# Patient Record
Sex: MALE | Race: WHITE | HISPANIC OR LATINO | Employment: FULL TIME | URBAN - METROPOLITAN AREA
[De-identification: names, ages, dates, MRNs, and addresses within clinical notes are randomized per-mention and may not be internally consistent; named-entity substitution may affect disease eponyms.]

---

## 2023-10-23 ENCOUNTER — APPOINTMENT (EMERGENCY)
Dept: RADIOLOGY | Facility: HOSPITAL | Age: 23
End: 2023-10-23

## 2023-10-23 ENCOUNTER — HOSPITAL ENCOUNTER (EMERGENCY)
Facility: HOSPITAL | Age: 23
Discharge: HOME | End: 2023-10-23
Attending: EMERGENCY MEDICINE

## 2023-10-23 VITALS
WEIGHT: 298.06 LBS | HEIGHT: 75 IN | SYSTOLIC BLOOD PRESSURE: 133 MMHG | TEMPERATURE: 98.9 F | DIASTOLIC BLOOD PRESSURE: 67 MMHG | HEART RATE: 87 BPM | BODY MASS INDEX: 37.06 KG/M2 | RESPIRATION RATE: 21 BRPM | OXYGEN SATURATION: 95 %

## 2023-10-23 DIAGNOSIS — R06.00 DYSPNEA: ICD-10-CM

## 2023-10-23 DIAGNOSIS — J45.901 ASTHMA EXACERBATION: Primary | ICD-10-CM

## 2023-10-23 LAB
2HR DELTA HS TROPONIN: 0 NG/L
ALBUMIN SERPL BCP-MCNC: 4.6 G/DL (ref 3.5–5)
ALP SERPL-CCNC: 101 U/L (ref 34–104)
ALT SERPL W P-5'-P-CCNC: 61 U/L (ref 7–52)
ANION GAP SERPL CALCULATED.3IONS-SCNC: 6 MMOL/L
AST SERPL W P-5'-P-CCNC: 40 U/L (ref 13–39)
ATRIAL RATE: 99 BPM
BASE EX.OXY STD BLDV CALC-SCNC: 57.8 % (ref 60–80)
BASE EX.OXY STD BLDV CALC-SCNC: 80.1 % (ref 60–80)
BASE EXCESS BLDV CALC-SCNC: -0.1 MMOL/L
BASE EXCESS BLDV CALC-SCNC: -0.7 MMOL/L
BASOPHILS # BLD AUTO: 0.03 THOUSANDS/ÂΜL (ref 0–0.1)
BASOPHILS NFR BLD AUTO: 0 % (ref 0–1)
BILIRUB SERPL-MCNC: 0.4 MG/DL (ref 0.2–1)
BNP SERPL-MCNC: 15 PG/ML (ref 0–100)
BUN SERPL-MCNC: 19 MG/DL (ref 5–25)
CALCIUM SERPL-MCNC: 9.6 MG/DL (ref 8.4–10.2)
CARDIAC TROPONIN I PNL SERPL HS: 3 NG/L
CARDIAC TROPONIN I PNL SERPL HS: 3 NG/L
CHLORIDE SERPL-SCNC: 104 MMOL/L (ref 96–108)
CO2 SERPL-SCNC: 30 MMOL/L (ref 21–32)
CREAT SERPL-MCNC: 1.26 MG/DL (ref 0.6–1.3)
D DIMER PPP FEU-MCNC: <0.27 UG/ML FEU
EOSINOPHIL # BLD AUTO: 0.81 THOUSAND/ÂΜL (ref 0–0.61)
EOSINOPHIL NFR BLD AUTO: 8 % (ref 0–6)
ERYTHROCYTE [DISTWIDTH] IN BLOOD BY AUTOMATED COUNT: 13.2 % (ref 11.6–15.1)
FLUAV RNA RESP QL NAA+PROBE: NEGATIVE
FLUBV RNA RESP QL NAA+PROBE: NEGATIVE
GFR SERPL CREATININE-BSD FRML MDRD: 79 ML/MIN/1.73SQ M
GLUCOSE SERPL-MCNC: 113 MG/DL (ref 65–140)
HCO3 BLDV-SCNC: 26 MMOL/L (ref 24–30)
HCO3 BLDV-SCNC: 27.3 MMOL/L (ref 24–30)
HCT VFR BLD AUTO: 49.3 % (ref 36.5–49.3)
HGB BLD-MCNC: 16.1 G/DL (ref 12–17)
IMM GRANULOCYTES # BLD AUTO: 0.05 THOUSAND/UL (ref 0–0.2)
IMM GRANULOCYTES NFR BLD AUTO: 1 % (ref 0–2)
LACTATE SERPL-SCNC: 1.4 MMOL/L (ref 0.5–2)
LYMPHOCYTES # BLD AUTO: 3.85 THOUSANDS/ÂΜL (ref 0.6–4.47)
LYMPHOCYTES NFR BLD AUTO: 36 % (ref 14–44)
MCH RBC QN AUTO: 27.7 PG (ref 26.8–34.3)
MCHC RBC AUTO-ENTMCNC: 32.7 G/DL (ref 31.4–37.4)
MCV RBC AUTO: 85 FL (ref 82–98)
MONOCYTES # BLD AUTO: 0.92 THOUSAND/ÂΜL (ref 0.17–1.22)
MONOCYTES NFR BLD AUTO: 9 % (ref 4–12)
NEUTROPHILS # BLD AUTO: 4.93 THOUSANDS/ÂΜL (ref 1.85–7.62)
NEUTS SEG NFR BLD AUTO: 46 % (ref 43–75)
NRBC BLD AUTO-RTO: 0 /100 WBCS
O2 CT BLDV-SCNC: 13.8 ML/DL
O2 CT BLDV-SCNC: 18.2 ML/DL
P AXIS: 65 DEGREES
PCO2 BLDV: 47.3 MM HG (ref 42–50)
PCO2 BLDV: 57.4 MM HG (ref 42–50)
PH BLDV: 7.29 [PH] (ref 7.3–7.4)
PH BLDV: 7.36 [PH] (ref 7.3–7.4)
PLATELET # BLD AUTO: 286 THOUSANDS/UL (ref 149–390)
PMV BLD AUTO: 10.2 FL (ref 8.9–12.7)
PO2 BLDV: 32.4 MM HG (ref 35–45)
PO2 BLDV: 46.4 MM HG (ref 35–45)
POTASSIUM SERPL-SCNC: 3.9 MMOL/L (ref 3.5–5.3)
PR INTERVAL: 156 MS
PROT SERPL-MCNC: 7.9 G/DL (ref 6.4–8.4)
QRS AXIS: 53 DEGREES
QRSD INTERVAL: 98 MS
QT INTERVAL: 330 MS
QTC INTERVAL: 423 MS
RBC # BLD AUTO: 5.82 MILLION/UL (ref 3.88–5.62)
RSV RNA RESP QL NAA+PROBE: NEGATIVE
S PYO DNA THROAT QL NAA+PROBE: NOT DETECTED
SARS-COV-2 RNA RESP QL NAA+PROBE: NEGATIVE
SODIUM SERPL-SCNC: 140 MMOL/L (ref 135–147)
T WAVE AXIS: 63 DEGREES
VENTRICULAR RATE: 99 BPM
WBC # BLD AUTO: 10.59 THOUSAND/UL (ref 4.31–10.16)

## 2023-10-23 PROCEDURE — 93010 ELECTROCARDIOGRAM REPORT: CPT | Performed by: INTERNAL MEDICINE

## 2023-10-23 PROCEDURE — 83880 ASSAY OF NATRIURETIC PEPTIDE: CPT | Performed by: EMERGENCY MEDICINE

## 2023-10-23 PROCEDURE — 0241U HB NFCT DS VIR RESP RNA 4 TRGT: CPT | Performed by: EMERGENCY MEDICINE

## 2023-10-23 PROCEDURE — 84484 ASSAY OF TROPONIN QUANT: CPT | Performed by: EMERGENCY MEDICINE

## 2023-10-23 PROCEDURE — 96375 TX/PRO/DX INJ NEW DRUG ADDON: CPT

## 2023-10-23 PROCEDURE — 85379 FIBRIN DEGRADATION QUANT: CPT | Performed by: EMERGENCY MEDICINE

## 2023-10-23 PROCEDURE — 87040 BLOOD CULTURE FOR BACTERIA: CPT | Performed by: EMERGENCY MEDICINE

## 2023-10-23 PROCEDURE — 99285 EMERGENCY DEPT VISIT HI MDM: CPT

## 2023-10-23 PROCEDURE — 71045 X-RAY EXAM CHEST 1 VIEW: CPT

## 2023-10-23 PROCEDURE — 99285 EMERGENCY DEPT VISIT HI MDM: CPT | Performed by: EMERGENCY MEDICINE

## 2023-10-23 PROCEDURE — 93005 ELECTROCARDIOGRAM TRACING: CPT

## 2023-10-23 PROCEDURE — 85025 COMPLETE CBC W/AUTO DIFF WBC: CPT | Performed by: EMERGENCY MEDICINE

## 2023-10-23 PROCEDURE — 80053 COMPREHEN METABOLIC PANEL: CPT | Performed by: EMERGENCY MEDICINE

## 2023-10-23 PROCEDURE — 83605 ASSAY OF LACTIC ACID: CPT | Performed by: EMERGENCY MEDICINE

## 2023-10-23 PROCEDURE — 82805 BLOOD GASES W/O2 SATURATION: CPT | Performed by: EMERGENCY MEDICINE

## 2023-10-23 PROCEDURE — 87651 STREP A DNA AMP PROBE: CPT | Performed by: EMERGENCY MEDICINE

## 2023-10-23 PROCEDURE — 94640 AIRWAY INHALATION TREATMENT: CPT

## 2023-10-23 PROCEDURE — 96365 THER/PROPH/DIAG IV INF INIT: CPT

## 2023-10-23 PROCEDURE — 36415 COLL VENOUS BLD VENIPUNCTURE: CPT | Performed by: EMERGENCY MEDICINE

## 2023-10-23 RX ORDER — ALBUTEROL SULFATE 90 UG/1
2 AEROSOL, METERED RESPIRATORY (INHALATION) ONCE
Status: COMPLETED | OUTPATIENT
Start: 2023-10-23 | End: 2023-10-23

## 2023-10-23 RX ORDER — ALBUTEROL SULFATE 2.5 MG/3ML
2.5 SOLUTION RESPIRATORY (INHALATION) ONCE
Status: COMPLETED | OUTPATIENT
Start: 2023-10-23 | End: 2023-10-23

## 2023-10-23 RX ORDER — PREDNISONE 20 MG/1
60 TABLET ORAL DAILY
Qty: 15 TABLET | Refills: 0 | Status: SHIPPED | OUTPATIENT
Start: 2023-10-23 | End: 2023-10-28

## 2023-10-23 RX ORDER — MAGNESIUM SULFATE HEPTAHYDRATE 40 MG/ML
2 INJECTION, SOLUTION INTRAVENOUS ONCE
Status: COMPLETED | OUTPATIENT
Start: 2023-10-23 | End: 2023-10-23

## 2023-10-23 RX ORDER — ALBUTEROL SULFATE 90 UG/1
2 AEROSOL, METERED RESPIRATORY (INHALATION) EVERY 6 HOURS PRN
Qty: 8.5 G | Refills: 0 | Status: SHIPPED | OUTPATIENT
Start: 2023-10-23

## 2023-10-23 RX ORDER — METHYLPREDNISOLONE SODIUM SUCCINATE 125 MG/2ML
80 INJECTION, POWDER, LYOPHILIZED, FOR SOLUTION INTRAMUSCULAR; INTRAVENOUS ONCE
Status: COMPLETED | OUTPATIENT
Start: 2023-10-23 | End: 2023-10-23

## 2023-10-23 RX ORDER — IPRATROPIUM BROMIDE AND ALBUTEROL SULFATE 2.5; .5 MG/3ML; MG/3ML
3 SOLUTION RESPIRATORY (INHALATION) ONCE AS NEEDED
Status: COMPLETED | OUTPATIENT
Start: 2023-10-23 | End: 2023-10-23

## 2023-10-23 RX ADMIN — IPRATROPIUM BROMIDE AND ALBUTEROL SULFATE 3 ML: 2.5; .5 SOLUTION RESPIRATORY (INHALATION) at 05:00

## 2023-10-23 RX ADMIN — ALBUTEROL SULFATE 2.5 MG: 2.5 SOLUTION RESPIRATORY (INHALATION) at 03:52

## 2023-10-23 RX ADMIN — ALBUTEROL SULFATE 2 PUFF: 90 AEROSOL, METERED RESPIRATORY (INHALATION) at 06:55

## 2023-10-23 RX ADMIN — MAGNESIUM SULFATE HEPTAHYDRATE 2 G: 40 INJECTION, SOLUTION INTRAVENOUS at 04:13

## 2023-10-23 RX ADMIN — METHYLPREDNISOLONE SODIUM SUCCINATE 80 MG: 125 INJECTION, POWDER, FOR SOLUTION INTRAMUSCULAR; INTRAVENOUS at 04:13

## 2023-10-23 NOTE — Clinical Note
accompanied Yazmin Doty to the emergency department on 10/23/2023. Return date if applicable: If you have any questions or concerns, please don't hesitate to call.       Anisha Garibay MD

## 2023-10-23 NOTE — ED PROVIDER NOTES
History  Chief Complaint   Patient presents with    Shortness of Breath     Pt presents with a c/o SOB since yesterday and feels like his throat is closing. Pt has hx of asthma. Audible wheezing in triage. SPO2 98% in triage. HPI  24 y.o. male with a PMH of asthma and a solitary kidney since birth presents with a chief complaints of shortness of breath. Patient speaks primarily Sinhala so  used for initial evaluation. Patient conversant upon initial evaluation, able to provide information regarding history. Patient with tachypnea and mild accessory muscle use but without signs of hypoxia and initial pulse oxymetry without hypoxemia. There is audible wheezing so patient was started on nebulized bronchodilators immediately. Patient affirms 2 days of progressive dyspnea that started gradually, progressively worsened and continues in the emergency room. Patient notes a history of asthma that was previously treated with medication however he has not seen any physician since he moved from the [facility] 4 years ago and is not on any medications regularly. Patient affirms cough. Patient affirms  chest pain that he describes as a tightness. Patient denies acute leg pain or swelling. Patient denies any significant acute weight gain. Patient denies any fever/chills. Patient denies hematochezia or melanotic stools, nausea/vomiting, new rashes, diaphoresis, palpitations, weakness, dizziness, syncope, focal weakness or paresthesia. All other review of systems reviewed and noted to be negative. Patient denies a history of atherosclerotic disease (CAD/TIA/CVA/PAD). Patient denies any history of prior cardiopulmonary surgery. Patient denies any use of tobacco in the past 90 days. Patient denies any immobilization of at least 3 days or surgery in the past 4 weeks. Patient denies any history of DVT or PE.     Patient denies any history or family history of thrombophilia. Patient denies any malignancy with treatment within the past 6 months. Patient denies any extended travel greater than 10 hours [facility] 2003). Focused Objective. PHYSICAL EXAM:  Constitutional:  No acute distress  Neck:  No asymmetry without obvious masses or swelling; no tracheal deviation. No jugular venous distention. No stridor. No bruit. CV: Tachycardic rate and regular rhythm. Peripheral pulses intact and equal.  Respiratory:  Normal inspection with no rash, signs of infection, or trauma. No intercostal, supraclavicular, subcostal, or substernal retractions. No tenderness or crepitus on palpitation. Auscultation demonstrates diffuse wheezing with adequate air movement. Medical Decision Making  Patient presents with dyspnea representing a broad differential, including multiple emergent diagnoses. Given the large differential, the decision making in this case is of high complexity. Patient presents afebrile without history of fever/chills making infectious etiologies less likely based upon available information. Patient has a past medical history suggestive of bronchospasm likely secondary to uncontrolled asthma with exacerbation. EKG obtained and reviewed independently by myself, which was interpreted by myself as normal sinus rhythm with no acute ST segment changes. There is no prior to compare. Patient placed on cardiac monitoring. CXR will be obtained to evaluate for alternative pathologies, including pneumothorax, pulmonary edema, or pneumonia. Laboratory analysis including CBC to evaluate for anemia and evaluate potential for significant leukocytosis. Will obtain BMP to evaluate renal function and electrolytes including possibility of metabolic derangement accounting for patient's symptoms. Considering patient's history, will obtain troponin to evaluate for ACS.   Will obtain VBG to further evaluate possibility of metabolic derangement as a component of the patient's symptoms and evaluate the chronicity of the possible symptamatology. Will obtain B-type natriuretic peptide as screening for possible congestive heart failure as patient has no known history though patient's course is concerning for possible diagnosed etiology. Patient has symptoms and history concerning for possible pulmonary embolism. Regarding this etiology, patient's risk stratification by the Wells' Criteria for Pulmonary Embolism (Two Tier Model):  no - clinical signs or symptoms of DVT (3)  no - PE most likely diagnosis (3)  yes - Heart rate greater than 100 (1.5)  no - Immobilization at least 3 days OR surgery in the previous 4 weeks (1.5)  no - Previous, objectively diagnosed PE or DVT (1.5)  no - Hemoptysis (1)  no - Malignancy with treatment within 6 months or palliative (1)    Patient's risk further evaluated by the [facility] Criteria for Pulmonary Embolism:  no - age is greater than or equal to 50  yes - Heart rate greater than 100  yes - O2 sat on room air < 95%  no - Prior history of PE or DVT  no - Recent trauma or surgery  no - Hemoptysis  no - Use of exogenous estrogen  no - Unilateral leg swelling    Patient has a low risk Wells' criteria but is unable to be cleared via the [facility] criteria. As such, a D-Dimer will be ordered for the patient to evaluate for the potential for pulmonary embolism. If positive, CT imaging of the patient's chest will be obtained to evaluate for potential pulmonary embolism. Considering patient's history and examination, will attempt symptomatic management with Norco dilators along with corticosteroids and magnesium. Patient will be placed on continuous cardiopulmonary monitoring and reassessed. None       Past Medical History:   Diagnosis Date    Asthma        History reviewed. No pertinent surgical history. History reviewed. No pertinent family history. I have reviewed and agree with the history as documented.     E-Cigarette/Vaping E-Cigarette Use Never User      E-Cigarette/Vaping Substances    Nicotine No     THC No     CBD No     Flavoring No     Other No     Unknown No      Social History     Tobacco Use    Smoking status: Never    Smokeless tobacco: Never   Vaping Use    Vaping Use: Never used   Substance Use Topics    Alcohol use: Never    Drug use: Never       Review of Systems    Physical Exam  Physical Exam    Vital Signs  ED Triage Vitals [10/23/23 0346]   Temperature Pulse Respirations Blood Pressure SpO2   98.9 °F (37.2 °C) (!) 110 (!) 24 (!) 178/110 98 %      Temp Source Heart Rate Source Patient Position - Orthostatic VS BP Location FiO2 (%)   Tympanic Monitor Sitting Left arm --      Pain Score       --           Vitals:    10/23/23 0346 10/23/23 0500   BP: (!) 178/110 133/67   Pulse: (!) 110 87   Patient Position - Orthostatic VS: Sitting Sitting         Visual Acuity      ED Medications  Medications   albuterol inhalation solution 2.5 mg (2.5 mg Nebulization Given 10/23/23 0352)   methylPREDNISolone sodium succinate (Solu-MEDROL) injection 80 mg (80 mg Intravenous Given 10/23/23 0413)   magnesium sulfate 2 g/50 mL IVPB (premix) 2 g (0 g Intravenous Stopped 10/23/23 0534)   ipratropium-albuterol (DUO-NEB) 0.5-2.5 mg/3 mL inhalation solution 3 mL (3 mL Nebulization Given 10/23/23 0500)   albuterol (PROVENTIL HFA,VENTOLIN HFA) inhaler 2 puff (2 puffs Inhalation Given 10/23/23 0655)       Diagnostic Studies  Results Reviewed       Procedure Component Value Units Date/Time    Blood culture #1 [[id number]] Collected: 10/23/23 0405    Lab Status: Preliminary result Specimen: Blood from Arm, Left Updated: 10/23/23 1001     Blood Culture Received in Microbiology Lab. Culture in Progress. Blood culture #2 [[id number]] Collected: 10/23/23 0407    Lab Status: Preliminary result Specimen: Blood from Arm, Right Updated: 10/23/23 1001     Blood Culture Received in Microbiology Lab. Culture in Progress.     HS Troponin I 2hr [[id number]] (Normal) Collected: 10/23/23 0603    Lab Status: Final result Specimen: Blood from Arm, Right Updated: 10/23/23 0650     hs TnI 2hr 3 ng/L      Delta 2hr hsTnI 0 ng/L     Blood gas, venous [[id number]]  (Abnormal) Collected: 10/23/23 0603    Lab Status: Final result Specimen: Blood from Arm, Right Updated: 10/23/23 0614     pH, Andrea 7.358     pCO2, Andrea 47.3 mm Hg      pO2, Andrea 46.4 mm Hg      HCO3, Andrea 26.0 mmol/L      Base Excess, Andrea -0.1 mmol/L      O2 Content, Andrea 18.2 ml/dL      O2 HGB, VENOUS 80.1 %     Strep A PCR [[id number]]  (Normal) Collected: 10/23/23 0407    Lab Status: Final result Specimen: Throat Updated: 10/23/23 0534     STREP A PCR Not Detected    FLU/RSV/COVID - if FLU/RSV clinically relevant [[id number]]  (Normal) Collected: 10/23/23 0407    Lab Status: Final result Specimen: Nares from Nose Updated: 10/23/23 0503     SARS-CoV-2 Negative     INFLUENZA A PCR Negative     INFLUENZA B PCR Negative     RSV PCR Negative    Narrative:      FOR PEDIATRIC PATIENTS - copy/paste COVID Guidelines URL to browser: [email or web address]    SARS-CoV-2 assay is a Nucleic Acid Amplification assay intended for the  qualitative detection of nucleic acid from SARS-CoV-2 in nasopharyngeal  swabs. Results are for the presumptive identification of SARS-CoV-2 RNA. Positive results are indicative of infection with SARS-CoV-2, the virus  causing COVID-19, but do not rule out bacterial infection or co-infection  with other viruses. Laboratories within the [facility] and its  territories are required to report all positive results to the appropriate  public health authorities. Negative results do not preclude SARS-CoV-2  infection and should not be used as the sole basis for treatment or other  patient management decisions. Negative results must be combined with  clinical observations, patient history, and epidemiological information.   This test has not been FDA cleared or approved. This test has been authorized by FDA under an Emergency Use Authorization  (EUA). This test is only authorized for the duration of time the  declaration that circumstances exist justifying the authorization of the  emergency use of an in vitro diagnostic tests for detection of SARS-CoV-2  virus and/or diagnosis of COVID-19 infection under section 564(b)(1) of  the Act, 21 U. S.C. 219BRE-4(S)(6), unless the authorization is terminated  or revoked sooner. The test has been validated but independent review by FDA  and CLIA is pending. Test performed using [employer] GeneXpert: This RT-PCR assay targets N2,  a region unique to SARS-CoV-2. A conserved region in the E-gene was chosen  for pan-Sarbecovirus detection which includes SARS-CoV-2. According to CMS-2020-01-R, this platform meets the definition of high-throughput technology.     HS Troponin 0hr (reflex protocol) [[id number]]  (Normal) Collected: 10/23/23 0407    Lab Status: Final result Specimen: Blood from Arm, Right Updated: 10/23/23 0450     hs TnI 0hr 3 ng/L     B-Type Natriuretic Peptide(BNP) [[id number]]  (Normal) Collected: 10/23/23 0407    Lab Status: Final result Specimen: Blood from Arm, Right Updated: 10/23/23 0449     BNP 15 pg/mL     D-Dimer [[id number]]  (Normal) Collected: 10/23/23 0407    Lab Status: Final result Specimen: Blood from Arm, Right Updated: 10/23/23 0445     D-Dimer, Quant <0.27 ug/ml [facility]     Comprehensive metabolic panel [[id number]]  (Abnormal) Collected: 10/23/23 0407    Lab Status: Final result Specimen: Blood from Arm, Right Updated: 10/23/23 0441     Sodium 140 mmol/L      Potassium 3.9 mmol/L      Chloride 104 mmol/L      CO2 30 mmol/L      ANION GAP 6 mmol/L      BUN 19 mg/dL      Creatinine 1.26 mg/dL      Glucose 113 mg/dL      Calcium 9.6 mg/dL      AST 40 U/L      ALT 61 U/L      Alkaline Phosphatase 101 U/L      Total Protein 7.9 g/dL      Albumin 4.6 g/dL      Total Bilirubin 0.40 mg/dL      eGFR 79 ml/min/1.73sq m     Narrative:      [facility] guidelines for Chronic Kidney Disease (CKD):     Stage 1 with normal or high GFR (GFR > 90 mL/min/1.73 square meters)    Stage 2 Mild CKD (GFR = 60-89 mL/min/1.73 square meters)    Stage 3A Moderate CKD (GFR = 45-59 mL/min/1.73 square meters)    Stage 3B Moderate CKD (GFR = 30-44 mL/min/1.73 square meters)    Stage 4 Severe CKD (GFR = 15-29 mL/min/1.73 square meters)    Stage 5 End Stage CKD (GFR <15 mL/min/1.73 square meters)  Note: GFR calculation is accurate only with a steady state creatinine    Lactic acid, plasma (w/reflex if result > 2.0) [[id number]]  (Normal) Collected: 10/23/23 0407    Lab Status: Final result Specimen: Blood from Arm, Right Updated: 10/23/23 0441     LACTIC ACID 1.4 mmol/L     Narrative:      Result may be elevated if tourniquet was used during collection.     Blood gas, venous [[id number]]  (Abnormal) Collected: 10/23/23 0407    Lab Status: Final result Specimen: Blood from Arm, Right Updated: 10/23/23 0426     pH, Andrea 7.295     pCO2, Andrea 57.4 mm Hg      pO2, Andrea 32.4 mm Hg      HCO3, Andrea 27.3 mmol/L      Base Excess, Andrea -0.7 mmol/L      O2 Content, Andrea 13.8 ml/dL      O2 HGB, VENOUS 57.8 %     CBC and differential [[id number]]  (Abnormal) Collected: 10/23/23 0407    Lab Status: Final result Specimen: Blood from Arm, Right Updated: 10/23/23 0425     WBC 10.59 Thousand/uL      RBC 5.82 Million/uL      Hemoglobin 16.1 g/dL      Hematocrit 49.3 %      MCV 85 fL      MCH 27.7 pg      MCHC 32.7 g/dL      RDW 13.2 %      MPV 10.2 fL      Platelets 546 Thousands/uL      nRBC 0 /100 WBCs      Neutrophils Relative 46 %      Immat GRANS % 1 %      Lymphocytes Relative 36 %      Monocytes Relative 9 %      Eosinophils Relative 8 %      Basophils Relative 0 %      Neutrophils Absolute 4.93 Thousands/µL      Immature Grans Absolute 0.05 Thousand/uL      Lymphocytes Absolute 3.85 Thousands/µL      Monocytes Absolute 0.92 Thousand/µL Eosinophils Absolute 0.81 Thousand/µL      Basophils Absolute 0.03 Thousands/µL                    XR chest 1 view portable   Final Result by Tamera Coronado MD (10/23 3529)      No acute cardiopulmonary disease. Workstation performed: [id number]                    Procedures  Procedures         ED Course  ED Course as of 10/23/23 2104   Mon Oct 23, 2023   0543 Patient notes improvement in symptoms following symptomatic management. Patient talking in full sentences, no signs of respiratory distress. Discussed potential etiologies with the patient but based on patient's history and examination along with laboratory findings, most concerning for asthma exacerbation. Patient with mild respiratory acidosis so we will repeat VBG to ensure that this is improving. Will prescribe patient course of corticosteroids and provide an albuterol inhaler. Will provide information on follow-up with the clinic. SBIRT 22yo+      Flowsheet Row Most Recent Value   Initial Alcohol Screen: US AUDIT-C     1. How often do you have a drink containing alcohol? 0 Filed at: 10/23/2023 0410   2. How many drinks containing alcohol do you have on a typical day you are drinking? 0 Filed at: 10/23/2023 0410   3a. Male UNDER 65: How often do you have five or more drinks on one occasion? 0 Filed at: 10/23/2023 0410   Audit-C Score 0 Filed at: 10/23/2023 0410   THIEN: How many times in the past year have you. .. Used an illegal drug or used a prescription medication for non-medical reasons? Never Filed at: 10/23/2023 0410                      Medical Decision Making  Amount and/or Complexity of Data Reviewed  Labs: ordered. Radiology: ordered. Risk  Prescription drug management.              Disposition  Final diagnoses:   Asthma exacerbation   Dyspnea     Time reflects when diagnosis was documented in both MDM as applicable and the Disposition within this note       Time User Action Codes Description Comment    10/23/2023  5:48 AM Gerber Lewis [X43.113] Asthma exacerbation     10/23/2023  5:48 AM Gerber Lewis [R06.00] Dyspnea           ED Disposition       ED Disposition   Discharge    Condition   Stable    Date/Time   Mon Oct 23, 2023 Kai Luz discharge to home/self care. Follow-up Information       Follow up With Specialties Details Why Contact Info Additional Information    [facility] Family Medicine Schedule an appointment as soon as possible for a visit  Follow-up with primary care. [address] [facility] [id number] [phone] [facility], [address] [place], [place], [address], [address] [facility] Internal Medicine Call  Alternative follow-up with primary care through our clinic. [address] [facility] [address]  [address] [facility], [address] [facility], [place], [place], [address]   [address] [facility] Emergency Medicine Go to  If symptoms worsen [address] [facility] Emergency Department, [place], [place], [address]            Discharge Medication List as of 10/23/2023  5:51 AM        START taking these medications    Details   albuterol (ProAir HFA) 90 mcg/act inhaler Inhale 2 puffs every 6 (six) hours as needed for wheezing, Starting Mon 10/23/2023, Print      predniSONE 20 mg tablet Take 3 tablets (60 mg total) by mouth daily for 5 days, Starting Mon 10/23/2023, Until Sat 10/28/2023, Print             No discharge procedures on file.     PDMP Review       None            ED Provider  Electronically Signed by             Alex Kirby MD  10/23/23 4013

## 2023-10-23 NOTE — Clinical Note
Marli Butts was seen and treated in our emergency department on 10/23/2023. Diagnosis:     Boubacar Singh  may return to work on return date. He may return on this date: 10/25/2023         If you have any questions or concerns, please don't hesitate to call.       Sadia Stout RN    ______________________________           _______________          _______________  Hospital Representative                              Date                                Time

## 2023-10-28 LAB
BACTERIA BLD CULT: NORMAL
BACTERIA BLD CULT: NORMAL